# Patient Record
Sex: FEMALE | Race: WHITE | Employment: FULL TIME | URBAN - METROPOLITAN AREA
[De-identification: names, ages, dates, MRNs, and addresses within clinical notes are randomized per-mention and may not be internally consistent; named-entity substitution may affect disease eponyms.]

---

## 2017-01-03 ENCOUNTER — TELEPHONE (OUTPATIENT)
Dept: OBGYN CLINIC | Facility: CLINIC | Age: 57
End: 2017-01-03

## 2017-01-03 RX ORDER — VALACYCLOVIR HYDROCHLORIDE 500 MG/1
500 TABLET, FILM COATED ORAL
Qty: 90 TABLET | Refills: 2 | Status: SHIPPED | OUTPATIENT
Start: 2017-01-03 | End: 2017-10-10

## 2017-01-03 NOTE — TELEPHONE ENCOUNTER
Pt requesting refill of Acyclovir 500mg po daily. 3 mo supply for mail order. Annual 11/2/16  Refill ordered.

## 2017-04-17 PROCEDURE — 86803 HEPATITIS C AB TEST: CPT | Performed by: FAMILY MEDICINE

## 2017-10-10 RX ORDER — VALACYCLOVIR HYDROCHLORIDE 500 MG/1
TABLET, FILM COATED ORAL
Qty: 90 TABLET | Refills: 0 | Status: SHIPPED | OUTPATIENT
Start: 2017-10-10 | End: 2018-02-04

## 2017-11-20 ENCOUNTER — OFFICE VISIT (OUTPATIENT)
Dept: OBGYN CLINIC | Facility: CLINIC | Age: 57
End: 2017-11-20

## 2017-11-20 VITALS
HEIGHT: 60.25 IN | SYSTOLIC BLOOD PRESSURE: 122 MMHG | DIASTOLIC BLOOD PRESSURE: 70 MMHG | WEIGHT: 113 LBS | BODY MASS INDEX: 21.9 KG/M2 | HEART RATE: 90 BPM

## 2017-11-20 DIAGNOSIS — N95.2 POST-MENOPAUSAL ATROPHIC VAGINITIS: ICD-10-CM

## 2017-11-20 DIAGNOSIS — Z01.411 ENCOUNTER FOR GYNECOLOGICAL EXAMINATION WITH ABNORMAL FINDING: Primary | ICD-10-CM

## 2017-11-20 DIAGNOSIS — N36.8 PROLAPSE URETHRAL MUCOSA: ICD-10-CM

## 2017-11-20 DIAGNOSIS — Z12.31 SCREENING MAMMOGRAM, ENCOUNTER FOR: ICD-10-CM

## 2017-11-20 PROCEDURE — 99396 PREV VISIT EST AGE 40-64: CPT | Performed by: OBSTETRICS & GYNECOLOGY

## 2017-11-20 NOTE — PROGRESS NOTES
Patient ID:   Louisa King  is a 62year old female         HPI:     Here for gyne exam. Last seen 2016. New medical/surgical hx:  None. No LMP recorded. Patient is postmenopausal.  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 Will trial OTC vaginal moisturizer and lubricant. Will call if wants to start topical ERT. See as needed or in one year.

## 2018-01-03 ENCOUNTER — HOSPITAL ENCOUNTER (OUTPATIENT)
Dept: MAMMOGRAPHY | Age: 58
Discharge: HOME | End: 2018-01-03
Attending: OBSTETRICS & GYNECOLOGY
Payer: COMMERCIAL

## 2018-01-03 DIAGNOSIS — Z12.31 SCREENING MAMMOGRAM, ENCOUNTER FOR: ICD-10-CM

## 2018-01-03 PROCEDURE — 77067 SCR MAMMO BI INCL CAD: CPT | Performed by: OBSTETRICS & GYNECOLOGY

## 2018-02-06 RX ORDER — VALACYCLOVIR HYDROCHLORIDE 500 MG/1
TABLET, FILM COATED ORAL
Qty: 90 TABLET | Refills: 3 | Status: SHIPPED | OUTPATIENT
Start: 2018-02-06 | End: 2018-04-20

## 2018-04-20 RX ORDER — VALACYCLOVIR HYDROCHLORIDE 500 MG/1
500 TABLET, FILM COATED ORAL
Qty: 90 TABLET | Refills: 2 | Status: SHIPPED | OUTPATIENT
Start: 2018-04-20 | End: 2019-02-14

## 2018-04-20 NOTE — TELEPHONE ENCOUNTER
Fax received from [facility] mail order pharmacy requesting new prescription for Valacyclovir. Pt last seen 11/20/17. Previously given 90 tabs with 3 refills on 2/6/18 to [facility].   Prescription sent to [facility] with 90 day supply and 2 re

## 2019-01-21 ENCOUNTER — TELEPHONE (OUTPATIENT)
Dept: OBGYN CLINIC | Facility: CLINIC | Age: 59
End: 2019-01-21

## 2019-01-21 NOTE — TELEPHONE ENCOUNTER
PT wants mammogram order but she is going on line to schedule appointment  Explained to PT she needs appointment schedule prior to mammogram order    Can PT get mammogram order without an annual scheduled?     If so please call PT when order has been placed

## 2019-01-21 NOTE — TELEPHONE ENCOUNTER
Last OV: 11/20/17 with Dr. Max Durán for annual  Last mammogram: 1/3/18 screening mammo, birads 1- benign  Next appt: none scheduled    Patient has not been seen in last 14 mos.  Will need appointment for annual/breast exam and will receive mammogram order fro

## 2019-01-22 NOTE — TELEPHONE ENCOUNTER
Future Appointments   Date Time Provider [place]   2/25/2019  2:30 PM Augusto Tenorio MD [facility] [address]   [facility]28[facility]2019  [facility]    [facility]

## 2019-02-14 NOTE — TELEPHONE ENCOUNTER
Last OV: 11/20/2017  Last refill date: 02/06/18  Follow-up: RTC 1 year or PRN  Next appt.: 02/25/19    Can you please advise if OK for refill since patient has not been seen in >1 year.

## 2019-02-18 RX ORDER — VALACYCLOVIR HYDROCHLORIDE 500 MG/1
TABLET, FILM COATED ORAL
Qty: 90 TABLET | Refills: 0 | Status: SHIPPED | OUTPATIENT
Start: 2019-02-18 | End: 2019-08-05

## 2019-02-19 RX ORDER — VALACYCLOVIR HYDROCHLORIDE 500 MG/1
TABLET, FILM COATED ORAL
Qty: 90 TABLET | Refills: 0 | OUTPATIENT
Start: 2019-02-19

## 2019-02-25 ENCOUNTER — OFFICE VISIT (OUTPATIENT)
Dept: OBGYN CLINIC | Facility: CLINIC | Age: 59
End: 2019-02-25
Payer: COMMERCIAL

## 2019-02-25 VITALS
HEIGHT: 60 IN | HEART RATE: 89 BPM | WEIGHT: 116.19 LBS | BODY MASS INDEX: 22.81 KG/M2 | SYSTOLIC BLOOD PRESSURE: 98 MMHG | DIASTOLIC BLOOD PRESSURE: 72 MMHG

## 2019-02-25 DIAGNOSIS — N95.2 POST-MENOPAUSAL ATROPHIC VAGINITIS: ICD-10-CM

## 2019-02-25 DIAGNOSIS — Z12.31 SCREENING MAMMOGRAM, ENCOUNTER FOR: ICD-10-CM

## 2019-02-25 DIAGNOSIS — Z12.4 PAPANICOLAOU SMEAR FOR CERVICAL CANCER SCREENING: ICD-10-CM

## 2019-02-25 DIAGNOSIS — Z01.411 ENCOUNTER FOR GYNECOLOGICAL EXAMINATION WITH ABNORMAL FINDING: Primary | ICD-10-CM

## 2019-02-25 DIAGNOSIS — Z80.3 FAMILY HISTORY OF BREAST CANCER: ICD-10-CM

## 2019-02-25 PROCEDURE — 88175 CYTOPATH C/V AUTO FLUID REDO: CPT | Performed by: OBSTETRICS & GYNECOLOGY

## 2019-02-25 PROCEDURE — 99396 PREV VISIT EST AGE 40-64: CPT | Performed by: OBSTETRICS & GYNECOLOGY

## 2019-02-25 RX ORDER — AMOXICILLIN 250 MG
CAPSULE ORAL
COMMUNITY

## 2019-02-25 NOTE — PROGRESS NOTES
Patient ID:   Jersey Gaytan  is a 62year old female         HPI:     Here for gyne exam. Last seen 2017. New medical/surgical hx:  Gum graft. No LMP recorded. Patient is postmenopausal.  Menses:   Hx Prior Abnormal Pap: No  Pap Da Physical Exam   BP 98/72   Pulse 89   Ht 60\"   Wt 116 lb 3.2 oz   BMI 22.69 kg/m²   Neck:  Supple. Thyroid:  normal.  No cervical adenopathy. Cardiovascular: Normal rate, rhythm, heart sounds. Pulmonary/Chest: Clear to auscultation.   Breasts:   Symme

## 2019-02-26 ENCOUNTER — TELEPHONE (OUTPATIENT)
Dept: OBGYN CLINIC | Facility: CLINIC | Age: 59
End: 2019-02-26

## 2019-02-26 NOTE — TELEPHONE ENCOUNTER
Spoke with patient. She is unsure of the name of the medication. Per review of chart: NOTES:  Options for treating atrophic vaginitis discussed. Will start with Replens Q 3 days and lubricant for coitus. Follow up as needed or in one year.     Advised ivelisse

## 2019-02-26 NOTE — TELEPHONE ENCOUNTER
Patient saw Dr. Oliver Banks yesterday and he told her he was supposed to call in medication for her and she went to her pharmacy and there was no medication called in for her. Pt wants to be called when her medication is at her pharmacy.

## 2019-02-28 NOTE — TELEPHONE ENCOUNTER
Patient called back. States that she thought she was supposed to be getting a pill to take 3 times a week for her vaginal soreness. The Replens that she found at the store is a cream and moisturizer.  Explained to patient that his office notes did not menti

## 2019-02-28 NOTE — TELEPHONE ENCOUNTER
Discussed topical ERT options at visit and do so again over phone. Not sure about using ERT at this time. Will trial OTC moisturizer for now.

## 2019-03-05 ENCOUNTER — HOSPITAL ENCOUNTER (OUTPATIENT)
Dept: MAMMOGRAPHY | Age: 59
Discharge: HOME | End: 2019-03-05
Attending: OBSTETRICS & GYNECOLOGY
Payer: COMMERCIAL

## 2019-03-05 DIAGNOSIS — Z80.3 FAMILY HISTORY OF BREAST CANCER: ICD-10-CM

## 2019-03-05 DIAGNOSIS — Z12.31 SCREENING MAMMOGRAM, ENCOUNTER FOR: ICD-10-CM

## 2019-03-05 PROCEDURE — 77067 SCR MAMMO BI INCL CAD: CPT | Performed by: OBSTETRICS & GYNECOLOGY

## 2019-03-05 PROCEDURE — 77063 BREAST TOMOSYNTHESIS BI: CPT | Performed by: OBSTETRICS & GYNECOLOGY

## 2019-09-12 PROBLEM — R22.1 NECK MASS: Status: ACTIVE | Noted: 2019-09-12

## 2019-09-12 PROBLEM — H93.13 BILATERAL TINNITUS: Status: ACTIVE | Noted: 2019-09-12

## 2021-02-09 DIAGNOSIS — Z23 NEED FOR VACCINATION: ICD-10-CM

## 2022-02-03 ENCOUNTER — HOSPITAL ENCOUNTER (OUTPATIENT)
Dept: MAMMOGRAPHY | Age: 62
Discharge: HOME | End: 2022-02-03
Attending: FAMILY MEDICINE
Payer: COMMERCIAL

## 2022-02-03 DIAGNOSIS — Z12.31 BREAST CANCER SCREENING BY MAMMOGRAM: ICD-10-CM

## 2022-02-03 PROCEDURE — 77063 BREAST TOMOSYNTHESIS BI: CPT | Performed by: FAMILY MEDICINE

## 2022-02-03 PROCEDURE — 77067 SCR MAMMO BI INCL CAD: CPT | Performed by: FAMILY MEDICINE

## 2023-02-07 ENCOUNTER — HOSPITAL ENCOUNTER (OUTPATIENT)
Dept: MAMMOGRAPHY | Age: 63
Discharge: HOME | End: 2023-02-07
Attending: FAMILY MEDICINE
Payer: COMMERCIAL

## 2023-02-07 DIAGNOSIS — Z12.31 SCREENING MAMMOGRAM FOR BREAST CANCER: ICD-10-CM

## 2023-02-07 PROCEDURE — 77063 BREAST TOMOSYNTHESIS BI: CPT | Performed by: FAMILY MEDICINE

## 2023-02-07 PROCEDURE — 77067 SCR MAMMO BI INCL CAD: CPT | Performed by: FAMILY MEDICINE

## 2023-02-13 ENCOUNTER — HOSPITAL ENCOUNTER (OUTPATIENT)
Dept: MAMMOGRAPHY | Facility: HOSPITAL | Age: 63
Discharge: HOME | End: 2023-02-13
Attending: FAMILY MEDICINE
Payer: COMMERCIAL

## 2023-02-13 DIAGNOSIS — R92.2 INCONCLUSIVE MAMMOGRAM: ICD-10-CM

## 2023-02-13 PROCEDURE — 77065 DX MAMMO INCL CAD UNI: CPT | Performed by: FAMILY MEDICINE

## 2023-02-13 PROCEDURE — 76642 ULTRASOUND BREAST LIMITED: CPT | Performed by: FAMILY MEDICINE

## 2023-02-13 PROCEDURE — 77061 BREAST TOMOSYNTHESIS UNI: CPT | Performed by: FAMILY MEDICINE
